# Patient Record
Sex: FEMALE | Race: WHITE | NOT HISPANIC OR LATINO | Employment: OTHER | URBAN - METROPOLITAN AREA
[De-identification: names, ages, dates, MRNs, and addresses within clinical notes are randomized per-mention and may not be internally consistent; named-entity substitution may affect disease eponyms.]

---

## 2017-03-11 ENCOUNTER — HOSPITAL ENCOUNTER (OUTPATIENT)
Facility: HOSPITAL | Age: 62
Setting detail: OBSERVATION
Discharge: HOME | End: 2017-03-13
Attending: EMERGENCY MEDICINE | Admitting: FAMILY MEDICINE

## 2017-03-11 ENCOUNTER — APPOINTMENT (OUTPATIENT)
Dept: CT IMAGING | Facility: HOSPITAL | Age: 62
End: 2017-03-11

## 2017-03-11 ENCOUNTER — APPOINTMENT (OUTPATIENT)
Dept: GENERAL RADIOLOGY | Facility: HOSPITAL | Age: 62
End: 2017-03-11

## 2017-03-11 DIAGNOSIS — E78.2 MIXED HYPERLIPIDEMIA: ICD-10-CM

## 2017-03-11 DIAGNOSIS — R07.9 CHEST PAIN, UNSPECIFIED TYPE: ICD-10-CM

## 2017-03-11 DIAGNOSIS — R94.31 ABNORMAL EKG: ICD-10-CM

## 2017-03-11 DIAGNOSIS — I10 ESSENTIAL HYPERTENSION: ICD-10-CM

## 2017-03-11 DIAGNOSIS — I20.0 UNSTABLE ANGINA (HCC): Primary | ICD-10-CM

## 2017-03-11 DIAGNOSIS — Z87.891 HISTORY OF TOBACCO ABUSE: ICD-10-CM

## 2017-03-11 LAB
ALBUMIN SERPL-MCNC: 3.9 G/DL (ref 3.2–4.8)
ALBUMIN/GLOB SERPL: 1.5 G/DL (ref 1.5–2.5)
ALP SERPL-CCNC: 68 U/L (ref 25–100)
ALT SERPL W P-5'-P-CCNC: 23 U/L (ref 7–40)
ANION GAP SERPL CALCULATED.3IONS-SCNC: 2 MMOL/L (ref 3–11)
AST SERPL-CCNC: 28 U/L (ref 0–33)
BASOPHILS # BLD AUTO: 0.02 10*3/MM3 (ref 0–0.2)
BASOPHILS NFR BLD AUTO: 0.3 % (ref 0–1)
BILIRUB SERPL-MCNC: 0.3 MG/DL (ref 0.3–1.2)
BNP SERPL-MCNC: 10 PG/ML (ref 0–100)
BUN BLD-MCNC: 15 MG/DL (ref 9–23)
BUN/CREAT SERPL: 18.8 (ref 7–25)
CALCIUM SPEC-SCNC: 9 MG/DL (ref 8.7–10.4)
CHLORIDE SERPL-SCNC: 106 MMOL/L (ref 99–109)
CO2 SERPL-SCNC: 34 MMOL/L (ref 20–31)
CREAT BLD-MCNC: 0.8 MG/DL (ref 0.6–1.3)
DEPRECATED RDW RBC AUTO: 54.6 FL (ref 37–54)
EOSINOPHIL # BLD AUTO: 0.09 10*3/MM3 (ref 0.1–0.3)
EOSINOPHIL NFR BLD AUTO: 1.2 % (ref 0–3)
ERYTHROCYTE [DISTWIDTH] IN BLOOD BY AUTOMATED COUNT: 15.2 % (ref 11.3–14.5)
GFR SERPL CREATININE-BSD FRML MDRD: 73 ML/MIN/1.73
GLOBULIN UR ELPH-MCNC: 2.6 GM/DL
GLUCOSE BLD-MCNC: 139 MG/DL (ref 70–100)
HCT VFR BLD AUTO: 35.8 % (ref 34.5–44)
HGB BLD-MCNC: 11.9 G/DL (ref 11.5–15.5)
HOLD SPECIMEN: NORMAL
HOLD SPECIMEN: NORMAL
IMM GRANULOCYTES # BLD: 0.02 10*3/MM3 (ref 0–0.03)
IMM GRANULOCYTES NFR BLD: 0.3 % (ref 0–0.6)
LIPASE SERPL-CCNC: 147 U/L (ref 6–51)
LIPASE SERPL-CCNC: 161 U/L (ref 6–51)
LYMPHOCYTES # BLD AUTO: 1.59 10*3/MM3 (ref 0.6–4.8)
LYMPHOCYTES NFR BLD AUTO: 20.8 % (ref 24–44)
MCH RBC QN AUTO: 33.1 PG (ref 27–31)
MCHC RBC AUTO-ENTMCNC: 33.2 G/DL (ref 32–36)
MCV RBC AUTO: 99.7 FL (ref 80–99)
MONOCYTES # BLD AUTO: 0.17 10*3/MM3 (ref 0–1)
MONOCYTES NFR BLD AUTO: 2.2 % (ref 0–12)
NEUTROPHILS # BLD AUTO: 5.75 10*3/MM3 (ref 1.5–8.3)
NEUTROPHILS NFR BLD AUTO: 75.2 % (ref 41–71)
PLATELET # BLD AUTO: 186 10*3/MM3 (ref 150–450)
PMV BLD AUTO: 9 FL (ref 6–12)
POTASSIUM BLD-SCNC: 3.2 MMOL/L (ref 3.5–5.5)
PROT SERPL-MCNC: 6.5 G/DL (ref 5.7–8.2)
RBC # BLD AUTO: 3.59 10*6/MM3 (ref 3.89–5.14)
SODIUM BLD-SCNC: 142 MMOL/L (ref 132–146)
TROPONIN I SERPL-MCNC: 0 NG/ML (ref 0–0.07)
TROPONIN I SERPL-MCNC: 0 NG/ML (ref 0–0.07)
TROPONIN I SERPL-MCNC: <0.006 NG/ML
WBC NRBC COR # BLD: 7.64 10*3/MM3 (ref 3.5–10.8)
WHOLE BLOOD HOLD SPECIMEN: NORMAL
WHOLE BLOOD HOLD SPECIMEN: NORMAL

## 2017-03-11 PROCEDURE — 93005 ELECTROCARDIOGRAM TRACING: CPT

## 2017-03-11 PROCEDURE — 84484 ASSAY OF TROPONIN QUANT: CPT | Performed by: INTERNAL MEDICINE

## 2017-03-11 PROCEDURE — 99220 PR INITIAL OBSERVATION CARE/DAY 70 MINUTES: CPT | Performed by: INTERNAL MEDICINE

## 2017-03-11 PROCEDURE — 0 IOPAMIDOL 61 % SOLUTION: Performed by: EMERGENCY MEDICINE

## 2017-03-11 PROCEDURE — 83880 ASSAY OF NATRIURETIC PEPTIDE: CPT | Performed by: EMERGENCY MEDICINE

## 2017-03-11 PROCEDURE — 99284 EMERGENCY DEPT VISIT MOD MDM: CPT

## 2017-03-11 PROCEDURE — G0378 HOSPITAL OBSERVATION PER HR: HCPCS

## 2017-03-11 PROCEDURE — 71010 HC CHEST PA OR AP: CPT

## 2017-03-11 PROCEDURE — 74177 CT ABD & PELVIS W/CONTRAST: CPT

## 2017-03-11 PROCEDURE — 84484 ASSAY OF TROPONIN QUANT: CPT

## 2017-03-11 PROCEDURE — 36415 COLL VENOUS BLD VENIPUNCTURE: CPT

## 2017-03-11 PROCEDURE — 85025 COMPLETE CBC W/AUTO DIFF WBC: CPT | Performed by: EMERGENCY MEDICINE

## 2017-03-11 PROCEDURE — 80053 COMPREHEN METABOLIC PANEL: CPT | Performed by: EMERGENCY MEDICINE

## 2017-03-11 PROCEDURE — 83690 ASSAY OF LIPASE: CPT | Performed by: EMERGENCY MEDICINE

## 2017-03-11 PROCEDURE — 25010000002 HEPARIN (PORCINE) PER 1000 UNITS: Performed by: INTERNAL MEDICINE

## 2017-03-11 PROCEDURE — 93005 ELECTROCARDIOGRAM TRACING: CPT | Performed by: EMERGENCY MEDICINE

## 2017-03-11 PROCEDURE — 96372 THER/PROPH/DIAG INJ SC/IM: CPT

## 2017-03-11 RX ORDER — LISINOPRIL AND HYDROCHLOROTHIAZIDE 12.5; 1 MG/1; MG/1
1 TABLET ORAL DAILY
COMMUNITY

## 2017-03-11 RX ORDER — HEPARIN SODIUM 5000 [USP'U]/ML
5000 INJECTION, SOLUTION INTRAVENOUS; SUBCUTANEOUS EVERY 8 HOURS SCHEDULED
Status: DISCONTINUED | OUTPATIENT
Start: 2017-03-11 | End: 2017-03-12

## 2017-03-11 RX ORDER — CARVEDILOL 6.25 MG/1
6.25 TABLET ORAL EVERY 12 HOURS SCHEDULED
Status: DISCONTINUED | OUTPATIENT
Start: 2017-03-11 | End: 2017-03-13 | Stop reason: HOSPADM

## 2017-03-11 RX ORDER — ACETAMINOPHEN 325 MG/1
650 TABLET ORAL EVERY 4 HOURS PRN
Status: DISCONTINUED | OUTPATIENT
Start: 2017-03-11 | End: 2017-03-13 | Stop reason: HOSPADM

## 2017-03-11 RX ORDER — LANOLIN ALCOHOL/MO/W.PET/CERES
800 CREAM (GRAM) TOPICAL 2 TIMES DAILY
COMMUNITY

## 2017-03-11 RX ORDER — ASPIRIN 81 MG/1
324 TABLET, CHEWABLE ORAL ONCE
Status: DISCONTINUED | OUTPATIENT
Start: 2017-03-11 | End: 2017-03-11

## 2017-03-11 RX ORDER — LISINOPRIL 5 MG/1
5 TABLET ORAL
Status: DISCONTINUED | OUTPATIENT
Start: 2017-03-12 | End: 2017-03-13 | Stop reason: HOSPADM

## 2017-03-11 RX ORDER — TRAMADOL HYDROCHLORIDE 50 MG/1
50 TABLET ORAL EVERY 6 HOURS PRN
COMMUNITY

## 2017-03-11 RX ORDER — SODIUM CHLORIDE 0.9 % (FLUSH) 0.9 %
10 SYRINGE (ML) INJECTION AS NEEDED
Status: DISCONTINUED | OUTPATIENT
Start: 2017-03-11 | End: 2017-03-13 | Stop reason: HOSPADM

## 2017-03-11 RX ORDER — TRAZODONE HYDROCHLORIDE 150 MG/1
300 TABLET ORAL NIGHTLY PRN
COMMUNITY

## 2017-03-11 RX ORDER — CELECOXIB 100 MG/1
100 CAPSULE ORAL 2 TIMES DAILY
COMMUNITY
End: 2017-03-11

## 2017-03-11 RX ORDER — SODIUM CHLORIDE 0.9 % (FLUSH) 0.9 %
1-10 SYRINGE (ML) INJECTION AS NEEDED
Status: DISCONTINUED | OUTPATIENT
Start: 2017-03-11 | End: 2017-03-13 | Stop reason: HOSPADM

## 2017-03-11 RX ORDER — ANTIOX #8/OM3/DHA/EPA/LUT/ZEAX 250-2.5 MG
1 CAPSULE ORAL 2 TIMES DAILY
COMMUNITY

## 2017-03-11 RX ORDER — DOCUSATE SODIUM 100 MG/1
200 CAPSULE, LIQUID FILLED ORAL 2 TIMES DAILY
Status: DISCONTINUED | OUTPATIENT
Start: 2017-03-11 | End: 2017-03-13 | Stop reason: HOSPADM

## 2017-03-11 RX ORDER — CELECOXIB 200 MG/1
200 CAPSULE ORAL 2 TIMES DAILY PRN
COMMUNITY

## 2017-03-11 RX ORDER — NITROGLYCERIN 0.4 MG/1
0.4 TABLET SUBLINGUAL
Status: COMPLETED | OUTPATIENT
Start: 2017-03-11 | End: 2017-03-12

## 2017-03-11 RX ORDER — ASPIRIN 325 MG
325 TABLET ORAL DAILY
Status: DISCONTINUED | OUTPATIENT
Start: 2017-03-12 | End: 2017-03-13 | Stop reason: HOSPADM

## 2017-03-11 RX ORDER — DOCUSATE SODIUM 100 MG/1
200 CAPSULE, LIQUID FILLED ORAL 2 TIMES DAILY
COMMUNITY

## 2017-03-11 RX ORDER — TRAMADOL HYDROCHLORIDE 50 MG/1
50 TABLET ORAL EVERY 6 HOURS PRN
Status: DISCONTINUED | OUTPATIENT
Start: 2017-03-11 | End: 2017-03-13 | Stop reason: HOSPADM

## 2017-03-11 RX ORDER — ROSUVASTATIN CALCIUM 5 MG/1
5 TABLET, COATED ORAL WEEKLY
COMMUNITY

## 2017-03-11 RX ORDER — HYDROCODONE BITARTRATE AND ACETAMINOPHEN 7.5; 325 MG/1; MG/1
2 TABLET ORAL EVERY 4 HOURS PRN
Status: DISCONTINUED | OUTPATIENT
Start: 2017-03-11 | End: 2017-03-13 | Stop reason: HOSPADM

## 2017-03-11 RX ORDER — ASPIRIN 81 MG/1
81 TABLET, CHEWABLE ORAL DAILY
COMMUNITY

## 2017-03-11 RX ORDER — TRAZODONE HYDROCHLORIDE 100 MG/1
300 TABLET ORAL NIGHTLY PRN
Status: DISCONTINUED | OUTPATIENT
Start: 2017-03-11 | End: 2017-03-13 | Stop reason: HOSPADM

## 2017-03-11 RX ORDER — ROSUVASTATIN CALCIUM 10 MG/1
5 TABLET, COATED ORAL WEEKLY
Status: DISCONTINUED | OUTPATIENT
Start: 2017-03-11 | End: 2017-03-13 | Stop reason: HOSPADM

## 2017-03-11 RX ADMIN — IOPAMIDOL 85 ML: 612 INJECTION, SOLUTION INTRAVENOUS at 16:15

## 2017-03-11 RX ADMIN — NITROGLYCERIN 0.4 MG: 0.4 TABLET SUBLINGUAL at 15:59

## 2017-03-11 RX ADMIN — NITROGLYCERIN 0.4 MG: 0.4 TABLET SUBLINGUAL at 15:51

## 2017-03-11 RX ADMIN — HEPARIN SODIUM 5000 UNITS: 5000 INJECTION, SOLUTION INTRAVENOUS; SUBCUTANEOUS at 21:36

## 2017-03-11 NOTE — ED PROVIDER NOTES
"Subjective   HPI Comments: Ms. Tita Chaudhry is a 60 yo female who presents to the ED with c/o left chest pain onset suddenly at 1300 today. She reports that the pain feels like a squeezing and she has never had this feeling before. She denies nay nausea or diaphoresis, but has been dizzy. It does not radiate, but she reports left arm numbness. It temporarily resolved with Aspirin and nitroglycerin. She has no hx of heart disease or diabetes. But does have a hx of HTN, HLD, and smoking. She has had no fevers, vomiting, and diarrhea.       Patient is a 61 y.o. female presenting with chest pain.   History provided by:  Patient  Chest Pain   Pain location:  L chest  Pain quality: tightness (squeezing)    Pain radiates to:  Does not radiate  Pain severity:  Moderate  Onset quality:  Sudden  Duration:  3 hours  Timing:  Constant  Progression:  Waxing and waning  Chronicity:  New  Relieved by:  Nitroglycerin and aspirin  Worsened by:  Nothing  Associated symptoms: dizziness and numbness (left arm)    Associated symptoms: no abdominal pain, no cough, no diaphoresis, no fever, no lower extremity edema, no nausea, no shortness of breath and no vomiting    Risk factors: high cholesterol, hypertension and smoking    Risk factors: no coronary artery disease, no diabetes mellitus and not obese        Review of Systems   Constitutional: Negative for chills, diaphoresis and fever.   Respiratory: Negative for cough and shortness of breath.    Cardiovascular: Positive for chest pain.   Gastrointestinal: Negative for abdominal pain, diarrhea, nausea and vomiting.   Neurological: Positive for dizziness and numbness (left arm).   All other systems reviewed and are negative.      Past Medical History   Diagnosis Date   • Depression    • Hypertension        Allergies   Allergen Reactions   • Morphine Other (See Comments)     Makes the patient \"loopy\"       History reviewed. No pertinent past surgical history.    History reviewed. No " pertinent family history.    Social History     Social History   • Marital status:      Spouse name: N/A   • Number of children: N/A   • Years of education: N/A     Social History Main Topics   • Smoking status: Never Smoker   • Smokeless tobacco: None   • Alcohol use No   • Drug use: No   • Sexual activity: Defer     Other Topics Concern   • None     Social History Narrative   • None         Objective   Physical Exam   Constitutional: She is oriented to person, place, and time. She appears well-developed and well-nourished. No distress.   HENT:   Head: Normocephalic and atraumatic.   Eyes: Conjunctivae are normal. No scleral icterus.   Neck: Normal range of motion. Neck supple.   Cardiovascular: Normal rate, regular rhythm and normal heart sounds.    Pulmonary/Chest: Effort normal and breath sounds normal. No respiratory distress.   Abdominal: Soft. Bowel sounds are normal. There is no tenderness.   Musculoskeletal: Normal range of motion. She exhibits no edema.   Neurological: She is alert and oriented to person, place, and time.   Skin: Skin is warm and dry.   Psychiatric: She has a normal mood and affect. Her behavior is normal.   Nursing note and vitals reviewed.      Procedures         ED Course  ED Course   EKGs show ST depression in lateral leads.  No old ones for comparison.  CXR negative.  Pain resolves with NTG but recurs.  Lipase is up but not quite to 3x normal and CT negative for pancreatitis. Patient stable on serial rechecks.  Discussed exam findings, test results so far and concerns in detail at the bedside.  I am concerned this represents an early ACS.  Discussed need for admission for further evaluation and treatment.  I consulted the hospitalist who requested I hold off on blood thinners until he saw the patient.                    MDM  Number of Diagnoses or Management Options  Chest pain, unspecified type:      Amount and/or Complexity of Data Reviewed  Clinical lab tests: ordered and  reviewed  Tests in the radiology section of CPT®: ordered and reviewed  Discuss the patient with other providers: yes  Independent visualization of images, tracings, or specimens: yes        Final diagnoses:   Chest pain, unspecified type       Documentation assistance provided by willam Pritchett.  Information recorded by the scribe was done at my direction and has been verified and validated by me.     Chet Pritchett  03/11/17 1537       Vlad Pierson MD  03/11/17 2027

## 2017-03-11 NOTE — PROGRESS NOTES
Discharge Planning Assessment  [facility]     Patient Name: Tita Chaudhry  MRN: [id number]  Today's Date: 3/11/2017    Admit Date: 3/11/2017          Discharge Needs Assessment       03/11/17 1836    Living Environment    Lives With alone    Living Arrangements house    Provides Primary Care For no one    Quality Of Family Relationships unable to assess    Able to Return to Prior Living Arrangements yes    Discharge Needs Assessment    Concerns To Be Addressed no discharge needs identified    Readmission Within The Last 30 Days no previous admission in last 30 days    Anticipated Changes Related to Illness none    Equipment Currently Used at Home none    Equipment Needed After Discharge none    Transportation Available car;family or friend will provide    Discharge Contact Information if Applicable Ladi Camacho, mother, [phone]            Discharge Plan       03/11/17 1837    Case Management/Social Work Plan    Plan HOME    Additional Comments Pt lives alone in [place], is independent, no DME needs or oxygen. PCP Barby Cordon        Discharge Placement     No information found                Demographic Summary     None            Functional Status       03/11/17 1834    Functional Status Current    Ambulation 2-->assistive person    Transferring 2-->assistive person    Toileting 0-->independent    Bathing 0-->independent    Dressing 0-->independent    Eating 0-->independent    Communication 0-->understands/communicates without difficulty    Swallowing (if score 2 or more for any item, consult Rehab Services) 0-->swallows foods/liquids without difficulty    Change in Functional Status Since Onset of Current Illness/Injury no    Functional Status Prior    Ambulation 0-->independent    Transferring 0-->independent    Toileting 0-->independent    Bathing 0-->independent    Dressing 0-->independent    Eating 0-->independent    Communication 0-->understands/communicates without difficulty    Swallowing 0-->swallows  foods/liquids without difficulty    IADL    Medications independent    Meal Preparation independent    Housekeeping independent    Laundry independent    Shopping independent    Oral Care independent    Activity Tolerance    Current Activity Limitations none    Usual Activity Tolerance good    Current Activity Tolerance moderate    Cognitive/Perceptual/Developmental    Current Mental Status/Cognitive Functioning no deficits noted    Developmental Stage (Eriksson's Stages of Development) Stage 7 (35-65 years/Middle Adulthood) Generativity vs. Stagnation    Employment/Financial    Employment/Finance Comments insurance verified            Psychosocial     None            Abuse/Neglect     None            Legal     None            Substance Abuse     None            Patient Forms     None          Elba Lan

## 2017-03-12 ENCOUNTER — APPOINTMENT (OUTPATIENT)
Dept: CARDIOLOGY | Facility: HOSPITAL | Age: 62
End: 2017-03-12
Attending: INTERNAL MEDICINE

## 2017-03-12 LAB
ALBUMIN SERPL-MCNC: 3.4 G/DL (ref 3.2–4.8)
ALBUMIN/GLOB SERPL: 1.5 G/DL (ref 1.5–2.5)
ALP SERPL-CCNC: 57 U/L (ref 25–100)
ALT SERPL W P-5'-P-CCNC: 20 U/L (ref 7–40)
ANION GAP SERPL CALCULATED.3IONS-SCNC: 2 MMOL/L (ref 3–11)
ARTICHOKE IGE QN: 105 MG/DL (ref 0–130)
AST SERPL-CCNC: 22 U/L (ref 0–33)
BASOPHILS # BLD AUTO: 0.03 10*3/MM3 (ref 0–0.2)
BASOPHILS NFR BLD AUTO: 0.4 % (ref 0–1)
BILIRUB SERPL-MCNC: 0.3 MG/DL (ref 0.3–1.2)
BUN BLD-MCNC: 14 MG/DL (ref 9–23)
BUN/CREAT SERPL: 17.5 (ref 7–25)
CALCIUM SPEC-SCNC: 8.6 MG/DL (ref 8.7–10.4)
CHLORIDE SERPL-SCNC: 105 MMOL/L (ref 99–109)
CHOLEST SERPL-MCNC: 166 MG/DL (ref 0–200)
CO2 SERPL-SCNC: 33 MMOL/L (ref 20–31)
CREAT BLD-MCNC: 0.8 MG/DL (ref 0.6–1.3)
DEPRECATED RDW RBC AUTO: 54.8 FL (ref 37–54)
EOSINOPHIL # BLD AUTO: 0.24 10*3/MM3 (ref 0.1–0.3)
EOSINOPHIL NFR BLD AUTO: 3.1 % (ref 0–3)
ERYTHROCYTE [DISTWIDTH] IN BLOOD BY AUTOMATED COUNT: 15.3 % (ref 11.3–14.5)
GFR SERPL CREATININE-BSD FRML MDRD: 73 ML/MIN/1.73
GLOBULIN UR ELPH-MCNC: 2.2 GM/DL
GLUCOSE BLD-MCNC: 107 MG/DL (ref 70–100)
HBA1C MFR BLD: 5.3 % (ref 4.8–5.6)
HCT VFR BLD AUTO: 32.8 % (ref 34.5–44)
HDLC SERPL-MCNC: 39 MG/DL (ref 40–60)
HGB BLD-MCNC: 10.7 G/DL (ref 11.5–15.5)
IMM GRANULOCYTES # BLD: 0.01 10*3/MM3 (ref 0–0.03)
IMM GRANULOCYTES NFR BLD: 0.1 % (ref 0–0.6)
LYMPHOCYTES # BLD AUTO: 3.13 10*3/MM3 (ref 0.6–4.8)
LYMPHOCYTES NFR BLD AUTO: 40 % (ref 24–44)
MAGNESIUM SERPL-MCNC: 2 MG/DL (ref 1.3–2.7)
MCH RBC QN AUTO: 32.7 PG (ref 27–31)
MCHC RBC AUTO-ENTMCNC: 32.6 G/DL (ref 32–36)
MCV RBC AUTO: 100.3 FL (ref 80–99)
MONOCYTES # BLD AUTO: 0.25 10*3/MM3 (ref 0–1)
MONOCYTES NFR BLD AUTO: 3.2 % (ref 0–12)
NEUTROPHILS # BLD AUTO: 4.16 10*3/MM3 (ref 1.5–8.3)
NEUTROPHILS NFR BLD AUTO: 53.2 % (ref 41–71)
PHOSPHATE SERPL-MCNC: 3.2 MG/DL (ref 2.4–5.1)
PLATELET # BLD AUTO: 182 10*3/MM3 (ref 150–450)
PMV BLD AUTO: 9.2 FL (ref 6–12)
POTASSIUM BLD-SCNC: 3.4 MMOL/L (ref 3.5–5.5)
PROT SERPL-MCNC: 5.6 G/DL (ref 5.7–8.2)
RBC # BLD AUTO: 3.27 10*6/MM3 (ref 3.89–5.14)
SODIUM BLD-SCNC: 140 MMOL/L (ref 132–146)
TRIGL SERPL-MCNC: 241 MG/DL (ref 0–150)
TROPONIN I SERPL-MCNC: <0.006 NG/ML
WBC NRBC COR # BLD: 7.82 10*3/MM3 (ref 3.5–10.8)

## 2017-03-12 PROCEDURE — 25010000002 SULFUR HEXAFLUORIDE MICROSPH 60.7-25 MG RECONSTITUTED SUSPENSION

## 2017-03-12 PROCEDURE — 83036 HEMOGLOBIN GLYCOSYLATED A1C: CPT | Performed by: INTERNAL MEDICINE

## 2017-03-12 PROCEDURE — 85025 COMPLETE CBC W/AUTO DIFF WBC: CPT | Performed by: INTERNAL MEDICINE

## 2017-03-12 PROCEDURE — 25010000002 HEPARIN (PORCINE) PER 1000 UNITS: Performed by: INTERNAL MEDICINE

## 2017-03-12 PROCEDURE — 80061 LIPID PANEL: CPT | Performed by: INTERNAL MEDICINE

## 2017-03-12 PROCEDURE — 93306 TTE W/DOPPLER COMPLETE: CPT | Performed by: INTERNAL MEDICINE

## 2017-03-12 PROCEDURE — 25010000002 SULFUR HEXAFLUORIDE MICROSPH 60.7-25 MG RECONSTITUTED SUSPENSION: Performed by: FAMILY MEDICINE

## 2017-03-12 PROCEDURE — 93010 ELECTROCARDIOGRAM REPORT: CPT | Performed by: INTERNAL MEDICINE

## 2017-03-12 PROCEDURE — 96372 THER/PROPH/DIAG INJ SC/IM: CPT

## 2017-03-12 PROCEDURE — 25010000002 ENOXAPARIN PER 10 MG: Performed by: PHYSICIAN ASSISTANT

## 2017-03-12 PROCEDURE — 80053 COMPREHEN METABOLIC PANEL: CPT | Performed by: INTERNAL MEDICINE

## 2017-03-12 PROCEDURE — 84100 ASSAY OF PHOSPHORUS: CPT | Performed by: INTERNAL MEDICINE

## 2017-03-12 PROCEDURE — C8929 TTE W OR WO FOL WCON,DOPPLER: HCPCS

## 2017-03-12 PROCEDURE — 93005 ELECTROCARDIOGRAM TRACING: CPT | Performed by: FAMILY MEDICINE

## 2017-03-12 PROCEDURE — 83735 ASSAY OF MAGNESIUM: CPT | Performed by: INTERNAL MEDICINE

## 2017-03-12 PROCEDURE — 99242 OFF/OP CONSLTJ NEW/EST SF 20: CPT | Performed by: INTERNAL MEDICINE

## 2017-03-12 PROCEDURE — 99226 PR SBSQ OBSERVATION CARE/DAY 35 MINUTES: CPT | Performed by: FAMILY MEDICINE

## 2017-03-12 PROCEDURE — G0378 HOSPITAL OBSERVATION PER HR: HCPCS

## 2017-03-12 RX ORDER — CLOPIDOGREL BISULFATE 75 MG/1
300 TABLET ORAL ONCE
Status: COMPLETED | OUTPATIENT
Start: 2017-03-12 | End: 2017-03-12

## 2017-03-12 RX ORDER — POTASSIUM CHLORIDE 750 MG/1
20 CAPSULE, EXTENDED RELEASE ORAL ONCE
Status: COMPLETED | OUTPATIENT
Start: 2017-03-12 | End: 2017-03-12

## 2017-03-12 RX ORDER — CLOPIDOGREL BISULFATE 75 MG/1
75 TABLET ORAL DAILY
Status: DISCONTINUED | OUTPATIENT
Start: 2017-03-13 | End: 2017-03-13 | Stop reason: HOSPADM

## 2017-03-12 RX ADMIN — CLOPIDOGREL BISULFATE 300 MG: 75 TABLET, FILM COATED ORAL at 23:52

## 2017-03-12 RX ADMIN — DOCUSATE SODIUM 200 MG: 100 CAPSULE, LIQUID FILLED ORAL at 17:40

## 2017-03-12 RX ADMIN — ASPIRIN 325 MG ORAL TABLET 325 MG: 325 PILL ORAL at 08:21

## 2017-03-12 RX ADMIN — SULFUR HEXAFLUORIDE 3 ML: KIT at 08:30

## 2017-03-12 RX ADMIN — TRAMADOL HYDROCHLORIDE 50 MG: 50 TABLET, COATED ORAL at 21:00

## 2017-03-12 RX ADMIN — ENOXAPARIN SODIUM 60 MG: 60 INJECTION SUBCUTANEOUS at 14:28

## 2017-03-12 RX ADMIN — DOCUSATE SODIUM 200 MG: 100 CAPSULE, LIQUID FILLED ORAL at 08:23

## 2017-03-12 RX ADMIN — TRAZODONE HYDROCHLORIDE 300 MG: 100 TABLET ORAL at 22:00

## 2017-03-12 RX ADMIN — POTASSIUM CHLORIDE 20 MEQ: 750 CAPSULE, EXTENDED RELEASE ORAL at 14:26

## 2017-03-12 RX ADMIN — HEPARIN SODIUM 5000 UNITS: 5000 INJECTION, SOLUTION INTRAVENOUS; SUBCUTANEOUS at 06:12

## 2017-03-12 RX ADMIN — CLOPIDOGREL BISULFATE 300 MG: 75 TABLET, FILM COATED ORAL at 14:27

## 2017-03-12 RX ADMIN — LISINOPRIL 5 MG: 5 TABLET ORAL at 08:20

## 2017-03-12 RX ADMIN — NITROGLYCERIN 0.4 MG: 0.4 TABLET SUBLINGUAL at 10:03

## 2017-03-12 NOTE — PLAN OF CARE
Problem: Patient Care Overview (Adult)  Goal: Plan of Care Review  Outcome: Ongoing (interventions implemented as appropriate)    03/12/17 1503   Coping/Psychosocial Response Interventions   Plan Of Care Reviewed With patient   Patient Care Overview   Progress no change   Outcome Evaluation   Outcome Summary/Follow up Plan Alert and oriented, has c/o chest pain times one this shift, she was given nitro x 1 and got relief. Has been talking with family and friends with no c/o voiced.       Goal: Adult Individualization and Mutuality  Outcome: Ongoing (interventions implemented as appropriate)  Goal: Discharge Needs Assessment  Outcome: Ongoing (interventions implemented as appropriate)    Problem: Acute Coronary Syndrome (ACS) (Adult)  Goal: Signs and Symptoms of Listed Potential Problems Will be Absent or Manageable (Acute Coronary Syndrome)  Outcome: Ongoing (interventions implemented as appropriate)

## 2017-03-12 NOTE — PROGRESS NOTES
Hospitalist Daily Progress Note    Date of Admission: 3/11/2017   LOS: 0 days   PCP: Barby Cordon MD    Chief Complaint: chest pain    Subjective   History of Present Illness  Pt still having some CP    Review of Systems  General: no fevers, chills  Respiratory: no cough, dyspnea  Cardiovascular: no  palpitations  Abdomen: No abdominal pain, nausea, vomiting, or diarrhea  Neurologic: No focal weakness  All other systems reviewed and negative.    Objective   Physical Exam:  I have reviewed the vital signs.  Temp:  [96.9 °F (36.1 °C)-98.3 °F (36.8 °C)] 96.9 °F (36.1 °C)  Heart Rate:  [59-86] 65  Resp:  [12-18] 18  BP: (114-143)/(70-94) 136/79    Objective  General Appearance:    Alert, cooperative, no distress  Head:    Normocephalic, atraumatic  Eyes:    PERRL EOMI, Sclerae anicteric  Neck:   Supple, no mass  Lungs: Clear to auscultation bilaterally, respirations unlabored  Heart: Regular rate and rhythm, S1 and S2 normal, no murmur, rub or gallop  Abdomen:  Soft, non-tender, bowel sounds active, nondistended  Extremities: No clubbing, cyanosis, or edema to lower extremities  Pulses:  2+ and symmetric in distal lower extremities  Skin: No rashes no jaundice  Neurologic: Oriented x3, Normal strength to extremities    Results Review:    I have reviewed the labs, radiology results and diagnostic studies.      Results from last 7 days  Lab Units 03/12/17  0622   WBC 10*3/mm3 7.82   HEMOGLOBIN g/dL 10.7*   PLATELETS 10*3/mm3 182       Results from last 7 days  Lab Units 03/12/17  0622   SODIUM mmol/L 140   POTASSIUM mmol/L 3.4*   TOTAL CO2 mmol/L 33.0*   CREATININE mg/dL 0.80   GLUCOSE mg/dL 107*       I have reviewed the medications.    ---------------------------------------------------------------------------------------------  Assessment/Plan   Assessment & Plan  Assessment/Problem List    Active Problems:    Chest pain       Plan  -BNP wnl, troponin x 4 negative, EKG with ST depression in lateral leads. No old  EKGs for comparison.   -CXR on my review shows no acute infiltrates, no cardiomegaly, official radiology report pending.  -A1c 5.3%  -FLP with T.Chol 166, , HDL 39, Trig. 241  -ECHO pending  -ASA, coreg, plavix load and plavix 75 mg daily, lovenox 60 mg x 1.  -statin  -NPO after MN, LHC tomorrow.    -lipase 147, CT abd/pelvis unremarkable, no pancreatitis.  -low potassium, replaced.      DVT prophylaxis: lovenox  Discharge Planning: I expect patient to be discharged home in 1-2 days.    Abilio Kim MD 03/12/17 1:41 PM

## 2017-03-12 NOTE — PLAN OF CARE
Problem: Acute Coronary Syndrome (ACS) (Adult)  Goal: Signs and Symptoms of Listed Potential Problems Will be Absent or Manageable (Acute Coronary Syndrome)  Outcome: Ongoing (interventions implemented as appropriate)

## 2017-03-12 NOTE — PLAN OF CARE
Problem: Patient Care Overview (Adult)  Goal: Plan of Care Review  Outcome: Ongoing (interventions implemented as appropriate)    03/12/17 0551   Coping/Psychosocial Response Interventions   Plan Of Care Reviewed With patient   Patient Care Overview   Progress progress toward functional goals as expected   Outcome Evaluation   Outcome Summary/Follow up Plan room air sinus sinus jake in the 50's refused coreg last PM states it makes her feel to lethargic no ches pain or pressure tonight awaiting card consult

## 2017-03-12 NOTE — H&P
Hospital Medicine History and Physical    Primary Care Physician: Barby Cordon MD    Chief complaint CP    Subjective     History of Present Illness  Patient is a 61-year-old  female past medical history significant for hypertension, hyperlipidemia, arthritis, depression.  Also patient has had the about 10 pack years history of smoking but completely and tobacco free since about 3 years ago.  Patient evidently was in her usual state of health until today when the she started experiencing and chest pain.  Describes chest pain as more like a heaviness than the pain but it was the also hurting her.  Location was is to the left of sternum.  Pain radiated to the left arm and was associated with an some diaphoresis.  Denies any shortness of breath associated with it.  Patient tells me that and the pain episode lasted at least about half an hour until I'm SGOT that there and they started him her on nitroglycerin and which resolved pain.  Patient was brought to the emergency room and at the emergency room she had an other episode which responded to nitroglycerin.  Patient denies any previous history of chest pain.  Currently patient is resting in bed in no acute distress quite comfortable.  She is completely chest pain-free.  Hemodynamically completely stable and actually she was to go home.  Review of Systems   Otherwise complete ROS is negative except as mentioned in the HPI.    Past Medical History:   Past Medical History   Diagnosis Date   • Depression    • Hypertension        Past Surgical History:History reviewed. No pertinent past surgical history.    Family History: family history is not on file. no history of heart disease on the father's side.  History of hypertension on mother side.    Social History:  reports that she has never smoked. She does not have any smokeless tobacco history on file. She reports that she does not drink alcohol or use illicit drugs.    Medications:  Prescriptions Prior to  "Admission   Medication Sig Dispense Refill Last Dose   • aspirin 81 MG chewable tablet Chew 81 mg Daily.      • celecoxib (CeleBREX) 200 MG capsule Take 200 mg by mouth 2 (Two) Times a Day As Needed for Mild Pain (1-3).      • docusate sodium (COLACE) 100 MG capsule Take 200 mg by mouth 2 (Two) Times a Day.      • Etanercept (ENBREL) 25 MG reconstituted solution Inject 1 kit under the skin 1 (One) Time Per Week. Wednesdays      • folic acid (FOLVITE) 400 MCG tablet Take 800 mcg by mouth 2 (Two) Times a Day.      • lisinopril-hydrochlorothiazide (PRINZIDE,ZESTORETIC) 10-12.5 MG per tablet Take 1 tablet by mouth Daily.      • methotrexate 2.5 MG tablet Take 17.5 mg by mouth 1 (One) Time Per Week. Wednesdays      • multivitamins-minerals (PRESERVISION AREDS 2) capsule capsule Take 1 capsule by mouth 2 (Two) Times a Day.      • rosuvastatin (CRESTOR) 5 MG tablet Take 5 mg by mouth 1 (One) Time Per Week.      • traMADol (ULTRAM) 50 MG tablet Take 50 mg by mouth Every 6 (Six) Hours As Needed for moderate pain (4-6).      • traZODone (DESYREL) 150 MG tablet Take 300 mg by mouth At Night As Needed for sleep.      • Vortioxetine HBr (TRINTELLIX) 10 MG tablet Take 10 mg by mouth Daily.        Allergies   Allergen Reactions   • Morphine And Related Confusion       Objective    Physical Exam:   Vital Signs:   Visit Vitals   • /79 (BP Location: Left arm, Patient Position: Lying)   • Pulse 59   • Temp 98 °F (36.7 °C) (Temporal Artery )   • Resp 14   • Ht 68\" (172.7 cm)   • Wt 123 lb 12.8 oz (56.2 kg)   • SpO2 98%   • BMI 18.82 kg/m2     Physical Exam  General: Resting in bed in no acute distress.  Neurologic exam: Awake, alert, oriented ×3.  There is no focal weakness or numbness present.  HEENT: PERRLA.  Neck: Supple, no cervical lymphadenopathy present.  Lungs: Clear to auscultation bilaterally.  Cardiovascular: S1 and S2 present and normal.  Regular rate and rhythm.  No murmur gallop or rub audible.  GI: Soft, nontender, " not distended, bowel sounds present.  Extremities: No edema lower extremities.  Psychiatric exam: Normal mood and affect.    Results Reviewed:  I have personally reviewed current lab, radiology, and data and agree.    Results from last 7 days  Lab Units 03/11/17  1450   WBC 10*3/mm3 7.64   HEMOGLOBIN g/dL 11.9   PLATELETS 10*3/mm3 186       Results from last 7 days  Lab Units 03/11/17  1450   SODIUM mmol/L 142   POTASSIUM mmol/L 3.2*   TOTAL CO2 mmol/L 34.0*   CREATININE mg/dL 0.80   GLUCOSE mg/dL 139*   CALCIUM mg/dL 9.0           Assessment/Plan   Assessment & Plan  Active Problems:    Chest pain, substernal, radiating to the left arm, relieved by nitroglycerin.  Patient has multiple risk factors including history of smoking, age, hypertension and hyperlipidemia.     Hypertension, Currently controlled.    Hyperlipidemia,  is on statin therapy.     History of precancerous polyp in the colon and status post partial colectomy.     Depression.  Currently patient is asymptomatic.     Severe rheumatoid arthritis and patient is on disease modifying treatment.    PLAN:  -Admitted the patient to telemetry and monitor the heart.  - Aspirin, Coreg, lisinopril, nitroglycerin.  - Serial troponin.  - Labs in a.m. and also consult cardiology in a.m.  - Echocardiogram  - Continue home medication.  - DVT prophylaxis.        I discussed the patients findings and my recommendations with the patient and her family who were present at the bedside.  NOTE: Talked to the patient in details about the allergy to morphine.  Evidently patient gets confused and sometimes even hallucinates when she takes morphine however and no evidence of any allergic reaction.  The information was conveyed to pharmacy.    Renaldo Rojas MD 03/11/17 7:42 PM

## 2017-03-12 NOTE — CONSULTS
"[facility] at [facility]  CARDIOLOGY CONSULTATION NOTE    Tita Chaudhry  : 1955  MRN:[id number]  Home Phone:[phone]  [address]  [facility] [address]    Date of Admission:3/11/2017  Date of Consultation: 17    PCP: Barby Cordon MD    IDENTIFICATION: A 61 y.o. female resident of [place], [place]     Chief Complaint   Patient presents with   • Chest Pain       PROBLEM LIST:   1. Chest pain with abnormal (non-specific) EKG.  2. Hypertension  3. Hyperlipidemia  4. History of tobacco abuse, inactive 3 years ago  5. Rheumatoid and psoriatic arthritis  6. Depression   7. History of detached retina  8. Surgical history:   A. Partial colectomy    B. FRANCISCO JAVIER   C. Appendectomy    ALLERGIES:   Allergies   Allergen Reactions   • Morphine Other (See Comments)     Makes the patient \"loopy\"       HPI: Patient is a pleasant 62 y/o WF with history of HTN, HLP, and tobacco abuse, inactive 3 years ago, with no prior cardiac history who is seen in consultation today. She was watching a basketball game yesterday at a local restaurant, when she had acute onset of left sided chest pressure, which radiated to her left arm, and jaw. She had not eaten her dinner yet. EMS services were called and she was given Nitroglycerin which helped relieve her symptoms. These lasted for 45 minutes, and she had recurrence of chest pain in the ED, again relieved with Nitro. She denies associated symptoms of shortness of breath or nausea. She has been asymptomatic since being admitted, has ambulated with no recurring chest discomfort. She used to smoke 4-5 cigarettes daily for 30 years, but quit 3 years ago, moderate alcohol use, no drugs. She has no prior history of chest discomfort and no prior cardiac work-up. Currently she is chest pain free. She denies history of stroke or MI. Her only 2 bouts of pain were yesterday.    ROS: All systems have been reviewed and are negative with the exception of those " "mentioned in the HPI and problem list above.    Surgical History: History reviewed. As above     Social History     Social History   • Marital status:      Spouse name: N/A   • Number of children: N/A   • Years of education: N/A     Occupational History   • Not on file.     Social History Main Topics   • Smoking status: Never Smoker   • Smokeless tobacco: Not on file   • Alcohol use No   • Drug use: No   • Sexual activity: Defer     Family History: History reviewed. No pertinent family history.    Objective     Visit Vitals   • /93   • Pulse 62   • Temp 96.9 °F (36.1 °C) (Temporal Artery )   • Resp 12   • Ht 68\" (172.7 cm)   • Wt 123 lb 12.8 oz (56.2 kg)   • SpO2 96%   • BMI 18.82 kg/m2       Intake/Output Summary (Last 24 hours) at 03/12/17 0934  Last data filed at 03/12/17 0500   Gross per 24 hour   Intake    240 ml   Output   1000 ml   Net   -760 ml       PHYSICAL EXAM:  Constitutional:  Well-nourished, cooperative, in no acute distress.   Head:  Normocephalic, without obvious abnormality, atraumatic.   Neck: No adenopathy, supple, trachea midline, no thyromegaly, no    carotid bruit, no JVD.   Respiratory:   Clear to auscultation bilaterally; respirations regular, even and unlabored. No wheezes, rales or ronchi.    Cardiovascular:  Regular rhythm and normal rate, normal S1 and S2, no            murmur, no gallop, no rub, no click. No chest wall tenderness.   Pulses: Peripheral pulses are present and equal bilaterally.   GI:   Soft, non-distended. Bowel sounds heard throughout. No organomegaly or masses. Non-tender to palpation, no guarding.   Extremities: No edema, clubbing or cyanosis.   Skin: Skin is warm and dry. No bleeding, bruising or rash.   Neurological: Alert, oriented to time, person and place. No focal deficits.     Labs/Diagnostic Data    Results from last 7 days  Lab Units 03/12/17  0622 03/11/17  1450   SODIUM mmol/L 140 142   POTASSIUM mmol/L 3.4* 3.2*   CHLORIDE mmol/L 105 106 "   TOTAL CO2 mmol/L 33.0* 34.0*   BUN mg/dL 14 15   CREATININE mg/dL 0.80 0.80   GLUCOSE mg/dL 107* 139*   CALCIUM mg/dL 8.6* 9.0       Results from last 7 days  Lab Units 03/11/17  2333 03/11/17  2002   TROPONIN I ng/mL <0.006 <0.006       Results from last 7 days  Lab Units 03/12/17  0622 03/11/17  1450   WBC 10*3/mm3 7.82 7.64   HEMOGLOBIN g/dL 10.7* 11.9   HEMATOCRIT % 32.8* 35.8   PLATELETS 10*3/mm3 182 186       Results from last 7 days  Lab Units 03/12/17  0622   MAGNESIUM mg/dL 2.0       Results from last 7 days  Lab Units 03/12/17  0622   CHOLESTEROL mg/dL 166   TRIGLYCERIDES mg/dL 241*   HDL CHOL mg/dL 39*   LDL CHOL mg/dL 105           Results from last 7 days  Lab Units 03/12/17  0622   HEMOGLOBIN A1C % 5.30     I personally viewed and interpreted the patient's EKG/Telemetry data    Radiology Data:   CT Abdomen 3/11/17:  IMPRESSION:  1. No acute abnormality. Specifically, no radiographic evidence of acute   pancreatitis.  2. Tiny hiatal hernia.   3. Bilateral renal cortical thinning, consider renal insufficiency.   4. Right hemicolectomy.     Current Medications:      aspirin 325 mg Oral Daily   carvedilol 6.25 mg Oral Q12H   docusate sodium 200 mg Oral BID   heparin (porcine) 5,000 Units Subcutaneous Q8H   lisinopril 5 mg Oral Q24H   rosuvastatin 5 mg Oral Weekly          Assessment and Plan:     1. Chest pain   - symptoms of unstable angina, troponins negative x2, EKG with ST depression in lateral leads    - Echo underway              - her best option is cath with PCI standby and after I discussed this with her, she consents. Answered all questions.    2. HTN: controlled  3. HLD: on Crestor 5 mg nightly.     Thank you for allowing me to participate in the care of Tita Chaudhry. Feel free to contact me directly with any further questions or concerns.    I, Sanjiv Hammer MD, personally performed the services described as documented by the above named individual. I have made any necessary edits and it  is both accurate and complete 3/12/2017  12:17 PM      Cyndi Michelle PA-C   03/12/17   9:34 AM

## 2017-03-13 VITALS
OXYGEN SATURATION: 92 % | DIASTOLIC BLOOD PRESSURE: 84 MMHG | HEART RATE: 55 BPM | RESPIRATION RATE: 14 BRPM | WEIGHT: 123 LBS | SYSTOLIC BLOOD PRESSURE: 110 MMHG | TEMPERATURE: 97.6 F | HEIGHT: 68 IN | BODY MASS INDEX: 18.64 KG/M2

## 2017-03-13 LAB
ANION GAP SERPL CALCULATED.3IONS-SCNC: 1 MMOL/L (ref 3–11)
BH CV ECHO MEAS - AI DEC SLOPE: 463 CM/SEC^2
BH CV ECHO MEAS - AI MAX PG: 78.7 MMHG
BH CV ECHO MEAS - AI MAX VEL: 443.5 CM/SEC
BH CV ECHO MEAS - AI P1/2T: 280.6 MSEC
BH CV ECHO MEAS - AO ROOT AREA (BSA CORRECTED): 1.7
BH CV ECHO MEAS - AO ROOT AREA: 6.2 CM^2
BH CV ECHO MEAS - AO ROOT DIAM: 2.8 CM
BH CV ECHO MEAS - BSA(HAYCOCK): 1.6 M^2
BH CV ECHO MEAS - BSA: 1.7 M^2
BH CV ECHO MEAS - BZI_BMI: 18.7 KILOGRAMS/M^2
BH CV ECHO MEAS - BZI_METRIC_HEIGHT: 172.7 CM
BH CV ECHO MEAS - BZI_METRIC_WEIGHT: 55.8 KG
BH CV ECHO MEAS - CONTRAST EF (2CH): 65.1 ML/M^2
BH CV ECHO MEAS - CONTRAST EF 4CH: 63.8 ML/M^2
BH CV ECHO MEAS - EDV(CUBED): 87.5 ML
BH CV ECHO MEAS - EDV(MOD-SP2): 63 ML
BH CV ECHO MEAS - EDV(MOD-SP4): 58 ML
BH CV ECHO MEAS - EDV(TEICH): 89.6 ML
BH CV ECHO MEAS - EF(CUBED): 75.7 %
BH CV ECHO MEAS - EF(MOD-SP2): 65.1 %
BH CV ECHO MEAS - EF(MOD-SP4): 63.8 %
BH CV ECHO MEAS - EF(TEICH): 67.9 %
BH CV ECHO MEAS - ESV(CUBED): 21.3 ML
BH CV ECHO MEAS - ESV(MOD-SP2): 22 ML
BH CV ECHO MEAS - ESV(MOD-SP4): 21 ML
BH CV ECHO MEAS - ESV(TEICH): 28.8 ML
BH CV ECHO MEAS - FS: 37.6 %
BH CV ECHO MEAS - IVS/LVPW: 1
BH CV ECHO MEAS - IVSD: 0.84 CM
BH CV ECHO MEAS - LA DIMENSION: 3.1 CM
BH CV ECHO MEAS - LA/AO: 1.1
BH CV ECHO MEAS - LV DIASTOLIC VOL/BSA (35-75): 34.9 ML/M^2
BH CV ECHO MEAS - LV MASS(C)D: 116.3 GRAMS
BH CV ECHO MEAS - LV MASS(C)DI: 70 GRAMS/M^2
BH CV ECHO MEAS - LV SYSTOLIC VOL/BSA (12-30): 12.6 ML/M^2
BH CV ECHO MEAS - LVIDD: 4.4 CM
BH CV ECHO MEAS - LVIDS: 2.8 CM
BH CV ECHO MEAS - LVLD AP2: 6.5 CM
BH CV ECHO MEAS - LVLD AP4: 6.9 CM
BH CV ECHO MEAS - LVLS AP2: 5.2 CM
BH CV ECHO MEAS - LVLS AP4: 5.3 CM
BH CV ECHO MEAS - LVPWD: 0.82 CM
BH CV ECHO MEAS - MV A MAX VEL: 97.9 CM/SEC
BH CV ECHO MEAS - MV E MAX VEL: 100 CM/SEC
BH CV ECHO MEAS - MV E/A: 1
BH CV ECHO MEAS - PA ACC SLOPE: 323 CM/SEC^2
BH CV ECHO MEAS - PA ACC TIME: 0.2 SEC
BH CV ECHO MEAS - PA PR(ACCEL): -8.8 MMHG
BH CV ECHO MEAS - PI END-D VEL: 62.4 CM/SEC
BH CV ECHO MEAS - RAP SYSTOLE: 3 MMHG
BH CV ECHO MEAS - RVDD: 2.7 CM
BH CV ECHO MEAS - RVSP: 31.5 MMHG
BH CV ECHO MEAS - SI(CUBED): 39.9 ML/M^2
BH CV ECHO MEAS - SI(MOD-SP2): 24.7 ML/M^2
BH CV ECHO MEAS - SI(MOD-SP4): 22.3 ML/M^2
BH CV ECHO MEAS - SI(TEICH): 36.6 ML/M^2
BH CV ECHO MEAS - SV(CUBED): 66.3 ML
BH CV ECHO MEAS - SV(MOD-SP2): 41 ML
BH CV ECHO MEAS - SV(MOD-SP4): 37 ML
BH CV ECHO MEAS - SV(TEICH): 60.8 ML
BH CV ECHO MEAS - TAPSE (>1.6): 2 CM2
BH CV ECHO MEAS - TR MAX VEL: 267 CM/SEC
BH CV XLRA - RV BASE: 3.3 CM
BH CV XLRA - RV LENGTH: 6.8 CM
BH CV XLRA - RV MID: 2.5 CM
BUN BLD-MCNC: 13 MG/DL (ref 9–23)
BUN/CREAT SERPL: 18.6 (ref 7–25)
CALCIUM SPEC-SCNC: 8.7 MG/DL (ref 8.7–10.4)
CHLORIDE SERPL-SCNC: 107 MMOL/L (ref 99–109)
CO2 SERPL-SCNC: 30 MMOL/L (ref 20–31)
CREAT BLD-MCNC: 0.7 MG/DL (ref 0.6–1.3)
DEPRECATED RDW RBC AUTO: 54.1 FL (ref 37–54)
ERYTHROCYTE [DISTWIDTH] IN BLOOD BY AUTOMATED COUNT: 15.2 % (ref 11.3–14.5)
GFR SERPL CREATININE-BSD FRML MDRD: 85 ML/MIN/1.73
GLUCOSE BLD-MCNC: 119 MG/DL (ref 70–100)
HCT VFR BLD AUTO: 32.6 % (ref 34.5–44)
HGB BLD-MCNC: 10.7 G/DL (ref 11.5–15.5)
LEFT ATRIUM VOLUME INDEX: 28.3 ML/M2
LEFT ATRIUM VOLUME: 47 CM3
MCH RBC QN AUTO: 32.4 PG (ref 27–31)
MCHC RBC AUTO-ENTMCNC: 32.8 G/DL (ref 32–36)
MCV RBC AUTO: 98.8 FL (ref 80–99)
PLATELET # BLD AUTO: 184 10*3/MM3 (ref 150–450)
PMV BLD AUTO: 8.9 FL (ref 6–12)
POTASSIUM BLD-SCNC: 4 MMOL/L (ref 3.5–5.5)
RBC # BLD AUTO: 3.3 10*6/MM3 (ref 3.89–5.14)
SODIUM BLD-SCNC: 138 MMOL/L (ref 132–146)
WBC NRBC COR # BLD: 5.82 10*3/MM3 (ref 3.5–10.8)

## 2017-03-13 PROCEDURE — 80048 BASIC METABOLIC PNL TOTAL CA: CPT | Performed by: PHYSICIAN ASSISTANT

## 2017-03-13 PROCEDURE — G0378 HOSPITAL OBSERVATION PER HR: HCPCS

## 2017-03-13 PROCEDURE — 99225 PR SBSQ OBSERVATION CARE/DAY 25 MINUTES: CPT | Performed by: NURSE PRACTITIONER

## 2017-03-13 PROCEDURE — 85027 COMPLETE CBC AUTOMATED: CPT | Performed by: PHYSICIAN ASSISTANT

## 2017-03-13 PROCEDURE — 25010000002 MIDAZOLAM PER 1 MG: Performed by: INTERNAL MEDICINE

## 2017-03-13 PROCEDURE — 25010000002 FENTANYL CITRATE (PF) 100 MCG/2ML SOLUTION: Performed by: INTERNAL MEDICINE

## 2017-03-13 PROCEDURE — C1760 CLOSURE DEV, VASC: HCPCS | Performed by: INTERNAL MEDICINE

## 2017-03-13 PROCEDURE — C1894 INTRO/SHEATH, NON-LASER: HCPCS | Performed by: INTERNAL MEDICINE

## 2017-03-13 PROCEDURE — 93458 L HRT ARTERY/VENTRICLE ANGIO: CPT | Performed by: INTERNAL MEDICINE

## 2017-03-13 PROCEDURE — C1769 GUIDE WIRE: HCPCS | Performed by: INTERNAL MEDICINE

## 2017-03-13 PROCEDURE — 99024 POSTOP FOLLOW-UP VISIT: CPT | Performed by: INTERNAL MEDICINE

## 2017-03-13 PROCEDURE — 0 IOPAMIDOL PER 1 ML: Performed by: INTERNAL MEDICINE

## 2017-03-13 RX ORDER — ACETAMINOPHEN 325 MG/1
650 TABLET ORAL EVERY 4 HOURS PRN
Status: DISCONTINUED | OUTPATIENT
Start: 2017-03-13 | End: 2017-03-13 | Stop reason: HOSPADM

## 2017-03-13 RX ORDER — FENTANYL CITRATE 50 UG/ML
INJECTION, SOLUTION INTRAMUSCULAR; INTRAVENOUS AS NEEDED
Status: DISCONTINUED | OUTPATIENT
Start: 2017-03-13 | End: 2017-03-13 | Stop reason: HOSPADM

## 2017-03-13 RX ORDER — LIDOCAINE HYDROCHLORIDE 10 MG/ML
INJECTION, SOLUTION INFILTRATION; PERINEURAL AS NEEDED
Status: DISCONTINUED | OUTPATIENT
Start: 2017-03-13 | End: 2017-03-13 | Stop reason: HOSPADM

## 2017-03-13 RX ORDER — MIDAZOLAM HYDROCHLORIDE 1 MG/ML
INJECTION INTRAMUSCULAR; INTRAVENOUS AS NEEDED
Status: DISCONTINUED | OUTPATIENT
Start: 2017-03-13 | End: 2017-03-13 | Stop reason: HOSPADM

## 2017-03-13 RX ADMIN — DOCUSATE SODIUM 200 MG: 100 CAPSULE, LIQUID FILLED ORAL at 17:24

## 2017-03-13 RX ADMIN — TRAMADOL HYDROCHLORIDE 50 MG: 50 TABLET, COATED ORAL at 08:26

## 2017-03-13 RX ADMIN — CLOPIDOGREL BISULFATE 75 MG: 75 TABLET, FILM COATED ORAL at 08:23

## 2017-03-13 RX ADMIN — ASPIRIN 325 MG ORAL TABLET 325 MG: 325 PILL ORAL at 08:23

## 2017-03-13 RX ADMIN — Medication 1 PATCH: at 16:43

## 2017-03-13 RX ADMIN — DOCUSATE SODIUM 200 MG: 100 CAPSULE, LIQUID FILLED ORAL at 08:25

## 2017-03-13 RX ADMIN — LISINOPRIL 5 MG: 5 TABLET ORAL at 08:23

## 2017-03-13 NOTE — DISCHARGE INSTR - APPOINTMENTS
Call [phone]  to make an appointment with Dr. Cordon to be seen in on week after discharge.   Please call the office to confirm your appointment.   [address]  [facility] [address]  [facility] [address]        Call Dr. Hammer office at [phone] and schedule an appointment for one month.   [address]  [facility] [address]  [facility] [address]

## 2017-03-13 NOTE — PROGRESS NOTES
"  [facility]  PROGRESS NOTE    Date of Admission: 3/11/2017  Length of Stay: 1  Primary Care Physician: Barby Cordon MD    Chief Complaint: f/u Chest pain   Problem List:   1. Chest pain with abnormal (non-specific) EKG.  2. Hypertension  3. Hyperlipidemia  4. History of tobacco abuse, inactive 3 years ago  5. Rheumatoid and psoriatic arthritis  6. Depression   7. History of detached retina  8. Surgical history:  A. Partial colectomy   B. FRANCISCO JAVIER  C. Appendectomy    Subjective      No further chest pain, slept well, comfortable. Had some remaining questions about the procedure, and discussed in detail. Clinically \"stable\"       Objective     Visit Vitals   • /66   • Pulse 56   • Temp 97.8 °F (36.6 °C) (Temporal Artery )   • Resp 12   • Ht 68\" (172.7 cm)   • Wt 123 lb (55.8 kg)   • SpO2 95%   • BMI 18.7 kg/m2       Physical Exam:  GENERAL: Alert, cooperative, in no acute distress.   HEENT: Fundiscopic deferred, otherwise unremarkable.  NECK: No Jugular venous distention, adenopathy, or thyromegaly noted.   HEART: No discrete PMI is noted. Regular rhythm, normal rate, and no murmurs, gallops, or rubs. Normal exam.  LUNGS: Clear to auscultation bilaterally. No wheezing, rales or ronchi.  ABDOMEN: Flat without evidence of organomegaly, masses, or tenderness.  NEUROLOGIC: No focal abnormalities involving strength or sensation are noted.   EXTREMITIES: No clubbing, cyanosis, or edema noted. Peripheral pulses are present.    Results:    Results from last 7 days  Lab Units 03/13/17  0603 03/12/17  0622 03/11/17  1450   WBC 10*3/mm3 5.82 7.82 7.64   HEMOGLOBIN g/dL 10.7* 10.7* 11.9   HEMATOCRIT % 32.6* 32.8* 35.8   PLATELETS 10*3/mm3 184 182 186       Results from last 7 days  Lab Units 03/13/17  0603 03/12/17  0622 03/11/17  1450   SODIUM mmol/L 138 140 142   POTASSIUM mmol/L 4.0 3.4* 3.2*   CHLORIDE mmol/L 107 105 106   TOTAL CO2 mmol/L 30.0 33.0* 34.0*   BUN mg/dL 13 14 15 "   CREATININE mg/dL 0.70 0.80 0.80   GLUCOSE mg/dL 119* 107* 139*      Lab Results   Component Value Date    CHOL 166 03/12/2017    TRIG 241 (H) 03/12/2017    HDL 39 (L) 03/12/2017    LDLDIRECT 105 03/12/2017    AST 22 03/12/2017    ALT 20 03/12/2017       Results from last 7 days  Lab Units 03/12/17  0622   HEMOGLOBIN A1C % 5.30       Results from last 7 days  Lab Units 03/11/17  1450   BNP pg/mL 10.0       Results from last 7 days  Lab Units 03/11/17  2333 03/11/17  2002   TROPONIN I ng/mL <0.006 <0.006       Intake/Output Summary (Last 24 hours) at 03/13/17 0920  Last data filed at 03/12/17 2107   Gross per 24 hour   Intake      0 ml   Output    700 ml   Net   -700 ml     I personally viewed and interpreted the patient's EKG/Telemetry data    Radiology Data:   CXR 3/11/17:  FINDINGS: The heart, mediastinum and pulmonary vasculature appear within  normal limits. Lungs appear well-expanded and clear bilaterally. No  edema, effusion or pneumothorax is seen.       IMPRESSION:  Portable chest exam with no evidence of active disease.    Current Medications:    aspirin 325 mg Oral Daily   carvedilol 6.25 mg Oral Q12H   clopidogrel 75 mg Oral Daily   docusate sodium 200 mg Oral BID   lisinopril 5 mg Oral Q24H   rosuvastatin 5 mg Oral Weekly          Assessment and Plan:     1. Chest pain  - symptoms of unstable angina, troponins negative x2, EKG with ST depression in lateral leads: I strongly suspect ischemic heart disease.  - Echo pending read  - [facility] with intervention standby today, answered some more questions this morning.      2. HTN: controlled  3. HLD: on Crestor 5 mg nightly.     Final disposition to follow.     Cyndi Michelle PA-C   03/13/17   9:20 AM

## 2017-03-13 NOTE — PLAN OF CARE
"Problem: Patient Care Overview (Adult)  Goal: Plan of Care Review  Outcome: Ongoing (interventions implemented as appropriate)    03/13/17 0436   Coping/Psychosocial Response Interventions   Plan Of Care Reviewed With patient   Patient Care Overview   Progress no change   Outcome Evaluation   Outcome Summary/Follow up Plan Pt's vitals stable. Pt refused \"all Beta Blockers\" as they drop her heart rate too low, becomes symptomatic. Pt remained normal sinus to sinus jake. Pt denied chest pain. Pt did not sign consent for heart cath - still had questions, did remain NPO after midnight however. Pt inquired why Trinttellix 10mg not continuned on hospital MAR, will address with MD. Pt slept through the night.        Goal: Discharge Needs Assessment  Outcome: Ongoing (interventions implemented as appropriate)    03/12/17 1503 03/13/17 0436   Discharge Needs Assessment   Concerns To Be Addressed --  denies needs/concerns at this time   Readmission Within The Last 30 Days no previous admission in last 30 days --    Equipment Needed After Discharge none --    Discharge Disposition still a patient --    Current Health   Anticipated Changes Related to Illness none --    Self-Care   Equipment Currently Used at Home none --    Living Environment   Transportation Available car;family or friend will provide --          Problem: Acute Coronary Syndrome (ACS) (Adult)  Goal: Signs and Symptoms of Listed Potential Problems Will be Absent or Manageable (Acute Coronary Syndrome)  Outcome: Ongoing (interventions implemented as appropriate)    03/13/17 0436   Acute Coronary Syndrome (ACS)   Problems Assessed (Acute Coronary Syndrome (ACS)) all   Problems Present (Acute Coronary Syndrome (ACS)) none           "

## 2017-03-13 NOTE — PLAN OF CARE
"Problem: Patient Care Overview (Adult)  Goal: Plan of Care Review  Outcome: Ongoing (interventions implemented as appropriate)    03/13/17 6563   Coping/Psychosocial Response Interventions   Plan Of Care Reviewed With patient   Patient Care Overview   Progress progress toward functional goals as expected   Outcome Evaluation   Outcome Summary/Follow up Plan Pt had a clean ht cath today. Site is oozing put abdon patch on site. Pt refuses \"all beta blockers\". Pt has remain NS today. Pt denies cheat pain.            "

## 2017-03-13 NOTE — PROGRESS NOTES
"Hospitalist Daily Progress Note    Date of Admission: 3/11/2017   LOS: 0 days   PCP: Barby Cordon MD    Chief Complaint: chest pain    Subjective   Chest Pain    Associated symptoms include back pain. Pertinent negatives include no cough, fever or palpitations.   Patient seen this afternoon after her heart cath.  She denies any chest pain at this time.  She is having some low back pain from having to lay still.  She is hungry and would like to eat. Right groin site is intact.  She would love to go home today but is okay with staying until tomorrow morning.      Review of Systems   Constitutional: Negative for fever.   Respiratory: Negative for cough.    Cardiovascular: Negative for chest pain and palpitations.   Genitourinary: Negative.    Musculoskeletal: Positive for back pain.   Neurological: Negative.    Psychiatric/Behavioral: Negative.          Objective   Physical Exam:  I have reviewed the vital signs.  Temp:  [97.4 °F (36.3 °C)-97.8 °F (36.6 °C)] 97.6 °F (36.4 °C)  Heart Rate:  [55-71] 55  Resp:  [12-18] 14  BP: (106-142)/(66-93) 110/84    Objective:  Vital signs: (most recent): Blood pressure 110/84, pulse 55, temperature 97.6 °F (36.4 °C), temperature source Oral, resp. rate 14, height 68\" (172.7 cm), weight 123 lb (55.8 kg), SpO2 92 %.  No fever.            General: Resting in bed, NAD, no family at bedside.  HEENT: Normocephalic, mucous membranes moist, pupils equal  Neck: Supple, trachea midline  Cardiovascular: Regular rate and rhythm, S1-S2, no murmur  Respiratory: Clear to auscultation bilaterally, even unlabored breathing on room air  Abdomen: Soft, nontender, nondistended, bowel sounds +  Skin: Clean, dry, right groin access site stained but not oozing.    Extremities: BARRAZA, Symmetrical, pulses +, no edema noted  Neuro: Alert, oriented ×4, and pedal pushes equal, sensation intact  Psych: Appropriate, cooperative    Results Review:    I have reviewed the labs, radiology results and " diagnostic studies.      Results from last 7 days  Lab Units 03/13/17  0603   WBC 10*3/mm3 5.82   HEMOGLOBIN g/dL 10.7*   PLATELETS 10*3/mm3 184       Results from last 7 days  Lab Units 03/13/17  0603   SODIUM mmol/L 138   POTASSIUM mmol/L 4.0   TOTAL CO2 mmol/L 30.0   CREATININE mg/dL 0.70   GLUCOSE mg/dL 119*       I have reviewed the medications.    ---------------------------------------------------------------------------------------------  Assessment/Plan   Assessment & Plan  Assessment/Problem List    Active Problems:    Chest pain       Plan  -BNP wnl, troponin x 4 negative, EKG with ST depression in lateral leads. No old EKGs for comparison.   -CXR  shows no evidence of active disease  -A1c 5.3%  -FLP with T.Chol 166, , HDL 39, Trig. 241.  Continue crestor  -ECHO with no abnormalities except for mild aortic valve sclerosis  -Cards consulted.  S/p C today- no abnormalities  -continue bedrest per protocol tonight.  Hopefully home tomorrow if okay with Cards.      -lipase 147, CT abd/pelvis unremarkable, no pancreatitis.  -low potassium, replaced.      DVT prophylaxis: lovenox  Discharge Planning: I expect patient to be discharged home tomorrow if okay with Dr. Hammer.    Bertha Evans, APRN 03/13/17 3:15 PM

## 2017-03-14 NOTE — NURSING NOTE
Pt's R Groin site apparently was still oozing at 19:00 - cardiology PA aware. Dressing was clean, dry and intact at initial assessment @ 20:00, remained clean, dry and intact - no more oozing. Pt denied any pain, no hematoma or bruising noted. Pt reviewed discharge instructions, had no additional questions. IV and telemetry box removed. Pt in no apparent distress, awaiting transport.

## 2018-08-09 ENCOUNTER — HOSPITAL ENCOUNTER (OUTPATIENT)
Dept: ULTRASOUND IMAGING | Facility: HOSPITAL | Age: 63
Discharge: HOME | End: 2018-08-09

## 2018-08-09 ENCOUNTER — HOSPITAL ENCOUNTER (OUTPATIENT)
Dept: MAMMOGRAPHY | Facility: HOSPITAL | Age: 63
Discharge: HOME | End: 2018-08-09
Attending: FAMILY MEDICINE | Admitting: FAMILY MEDICINE

## 2018-08-09 DIAGNOSIS — N63.23 BREAST LUMP ON LEFT SIDE AT 4 O'CLOCK POSITION: ICD-10-CM

## 2018-08-09 PROCEDURE — 77066 DX MAMMO INCL CAD BI: CPT

## 2018-08-09 PROCEDURE — 76642 ULTRASOUND BREAST LIMITED: CPT | Performed by: RADIOLOGY

## 2018-08-09 PROCEDURE — 76642 ULTRASOUND BREAST LIMITED: CPT

## 2018-08-09 PROCEDURE — 77066 DX MAMMO INCL CAD BI: CPT | Performed by: RADIOLOGY

## 2018-08-09 PROCEDURE — G0279 TOMOSYNTHESIS, MAMMO: HCPCS

## 2018-08-09 PROCEDURE — 77062 BREAST TOMOSYNTHESIS BI: CPT | Performed by: RADIOLOGY

## 2021-06-17 ENCOUNTER — HOSPITAL ENCOUNTER (OUTPATIENT)
Dept: GENERAL RADIOLOGY | Facility: HOSPITAL | Age: 66
Discharge: HOME | End: 2021-06-17
Admitting: FAMILY MEDICINE

## 2021-06-17 ENCOUNTER — TRANSCRIBE ORDERS (OUTPATIENT)
Dept: GENERAL RADIOLOGY | Facility: HOSPITAL | Age: 66
End: 2021-06-17

## 2021-06-17 DIAGNOSIS — M25.551 RIGHT HIP PAIN: ICD-10-CM

## 2021-06-17 DIAGNOSIS — M25.551 RIGHT HIP PAIN: Primary | ICD-10-CM

## 2021-06-17 PROCEDURE — 73502 X-RAY EXAM HIP UNI 2-3 VIEWS: CPT

## 2022-01-26 RX ORDER — SODIUM, POTASSIUM,MAG SULFATES 17.5-3.13G
SOLUTION, RECONSTITUTED, ORAL ORAL
Qty: 354 ML | Refills: 0 | Status: SHIPPED | OUTPATIENT
Start: 2022-01-26

## 2022-02-02 ENCOUNTER — OUTSIDE FACILITY SERVICE (OUTPATIENT)
Dept: GASTROENTEROLOGY | Facility: CLINIC | Age: 67
End: 2022-02-02

## 2022-02-02 PROCEDURE — 45385 COLONOSCOPY W/LESION REMOVAL: CPT | Performed by: INTERNAL MEDICINE

## 2023-05-15 ENCOUNTER — TRANSCRIBE ORDERS (OUTPATIENT)
Dept: ADMINISTRATIVE | Facility: HOSPITAL | Age: 68
End: 2023-05-15
Payer: MEDICARE

## 2023-05-15 DIAGNOSIS — F17.200 NICOTINE DEPENDENCE, UNCOMPLICATED, UNSPECIFIED NICOTINE PRODUCT TYPE: Primary | ICD-10-CM

## 2023-08-10 ENCOUNTER — LAB (OUTPATIENT)
Dept: LAB | Facility: HOSPITAL | Age: 68
End: 2023-08-10
Payer: MEDICARE

## 2023-08-10 DIAGNOSIS — E78.00 PURE HYPERCHOLESTEROLEMIA: Primary | ICD-10-CM

## 2023-08-10 PROCEDURE — 80061 LIPID PANEL: CPT

## 2023-08-10 PROCEDURE — 36415 COLL VENOUS BLD VENIPUNCTURE: CPT

## 2023-08-10 PROCEDURE — 80053 COMPREHEN METABOLIC PANEL: CPT

## 2023-08-11 LAB
ALBUMIN SERPL-MCNC: 4.7 G/DL (ref 3.5–5.2)
ALBUMIN/GLOB SERPL: 2.2 G/DL
ALP SERPL-CCNC: 56 U/L (ref 39–117)
ALT SERPL W P-5'-P-CCNC: 11 U/L (ref 1–33)
ANION GAP SERPL CALCULATED.3IONS-SCNC: 10.6 MMOL/L (ref 5–15)
AST SERPL-CCNC: 21 U/L (ref 1–32)
BILIRUB SERPL-MCNC: 0.3 MG/DL (ref 0–1.2)
BUN SERPL-MCNC: 20 MG/DL (ref 8–23)
BUN/CREAT SERPL: 22 (ref 7–25)
CALCIUM SPEC-SCNC: 9.3 MG/DL (ref 8.6–10.5)
CHLORIDE SERPL-SCNC: 107 MMOL/L (ref 98–107)
CHOLEST SERPL-MCNC: 169 MG/DL (ref 0–200)
CO2 SERPL-SCNC: 23.4 MMOL/L (ref 22–29)
CREAT SERPL-MCNC: 0.91 MG/DL (ref 0.57–1)
EGFRCR SERPLBLD CKD-EPI 2021: 69.3 ML/MIN/1.73
GLOBULIN UR ELPH-MCNC: 2.1 GM/DL
GLUCOSE SERPL-MCNC: 109 MG/DL (ref 65–99)
HDLC SERPL-MCNC: 48 MG/DL (ref 40–60)
LDLC SERPL CALC-MCNC: 101 MG/DL (ref 0–100)
LDLC/HDLC SERPL: 2.07 {RATIO}
POTASSIUM SERPL-SCNC: 4.7 MMOL/L (ref 3.5–5.2)
PROT SERPL-MCNC: 6.8 G/DL (ref 6–8.5)
SODIUM SERPL-SCNC: 141 MMOL/L (ref 136–145)
TRIGL SERPL-MCNC: 108 MG/DL (ref 0–150)
VLDLC SERPL-MCNC: 20 MG/DL (ref 5–40)

## 2024-07-23 ENCOUNTER — HOSPITAL ENCOUNTER (OUTPATIENT)
Dept: GENERAL RADIOLOGY | Facility: HOSPITAL | Age: 69
Discharge: HOME | End: 2024-07-23
Payer: MEDICARE

## 2024-07-23 ENCOUNTER — LAB (OUTPATIENT)
Dept: LAB | Facility: HOSPITAL | Age: 69
End: 2024-07-23
Payer: MEDICARE

## 2024-07-23 ENCOUNTER — TRANSCRIBE ORDERS (OUTPATIENT)
Dept: LAB | Facility: HOSPITAL | Age: 69
End: 2024-07-23
Payer: MEDICARE

## 2024-07-23 ENCOUNTER — TRANSCRIBE ORDERS (OUTPATIENT)
Dept: ADMINISTRATIVE | Facility: HOSPITAL | Age: 69
End: 2024-07-23
Payer: MEDICARE

## 2024-07-23 DIAGNOSIS — I10 ESSENTIAL HYPERTENSION, MALIGNANT: ICD-10-CM

## 2024-07-23 DIAGNOSIS — N39.0 URINARY TRACT INFECTION WITHOUT HEMATURIA, SITE UNSPECIFIED: ICD-10-CM

## 2024-07-23 DIAGNOSIS — A09 INFECTIOUS COLITIS, ENTERITIS, AND GASTROENTERITIS: Primary | ICD-10-CM

## 2024-07-23 DIAGNOSIS — E78.00 PURE HYPERCHOLESTEROLEMIA: ICD-10-CM

## 2024-07-23 DIAGNOSIS — R05.9 COUGH, UNSPECIFIED TYPE: Primary | ICD-10-CM

## 2024-07-23 DIAGNOSIS — A09 INFECTIOUS COLITIS, ENTERITIS, AND GASTROENTERITIS: ICD-10-CM

## 2024-07-23 LAB
ALBUMIN SERPL-MCNC: 4.5 G/DL (ref 3.5–5.2)
ALBUMIN/GLOB SERPL: 1.6 G/DL
ALP SERPL-CCNC: 61 U/L (ref 39–117)
ALT SERPL W P-5'-P-CCNC: 5 U/L (ref 1–33)
ANION GAP SERPL CALCULATED.3IONS-SCNC: 13.1 MMOL/L (ref 5–15)
AST SERPL-CCNC: 14 U/L (ref 1–32)
BILIRUB SERPL-MCNC: 0.4 MG/DL (ref 0–1.2)
BUN SERPL-MCNC: 9 MG/DL (ref 8–23)
BUN/CREAT SERPL: 9.8 (ref 7–25)
CALCIUM SPEC-SCNC: 9.4 MG/DL (ref 8.6–10.5)
CHLORIDE SERPL-SCNC: 101 MMOL/L (ref 98–107)
CHOLEST SERPL-MCNC: 196 MG/DL (ref 0–200)
CO2 SERPL-SCNC: 23.9 MMOL/L (ref 22–29)
CREAT SERPL-MCNC: 0.92 MG/DL (ref 0.57–1)
EGFRCR SERPLBLD CKD-EPI 2021: 68 ML/MIN/1.73
GLOBULIN UR ELPH-MCNC: 2.8 GM/DL
GLUCOSE SERPL-MCNC: 106 MG/DL (ref 65–99)
HDLC SERPL-MCNC: 38 MG/DL (ref 40–60)
LDLC SERPL CALC-MCNC: 106 MG/DL (ref 0–100)
LDLC/HDLC SERPL: 2.56 {RATIO}
POTASSIUM SERPL-SCNC: 2.9 MMOL/L (ref 3.5–5.2)
PROT SERPL-MCNC: 7.3 G/DL (ref 6–8.5)
SODIUM SERPL-SCNC: 138 MMOL/L (ref 136–145)
TRIGL SERPL-MCNC: 304 MG/DL (ref 0–150)
TSH SERPL DL<=0.05 MIU/L-ACNC: 2.71 UIU/ML (ref 0.27–4.2)
VLDLC SERPL-MCNC: 52 MG/DL (ref 5–40)

## 2024-07-23 PROCEDURE — 85007 BL SMEAR W/DIFF WBC COUNT: CPT

## 2024-07-23 PROCEDURE — 36415 COLL VENOUS BLD VENIPUNCTURE: CPT

## 2024-07-23 PROCEDURE — 87086 URINE CULTURE/COLONY COUNT: CPT

## 2024-07-23 PROCEDURE — 80053 COMPREHEN METABOLIC PANEL: CPT | Performed by: FAMILY MEDICINE

## 2024-07-23 PROCEDURE — 85027 COMPLETE CBC AUTOMATED: CPT

## 2024-07-23 PROCEDURE — 71046 X-RAY EXAM CHEST 2 VIEWS: CPT

## 2024-07-23 PROCEDURE — 84443 ASSAY THYROID STIM HORMONE: CPT

## 2024-07-23 PROCEDURE — 80061 LIPID PANEL: CPT

## 2024-07-24 LAB
BACTERIA SPEC AEROBE CULT: NORMAL
BASOPHILS # BLD MANUAL: 0 10*3/MM3 (ref 0–0.2)
BASOPHILS NFR BLD MANUAL: 0 % (ref 0–1.5)
BURR CELLS BLD QL SMEAR: ABNORMAL
DEPRECATED RDW RBC AUTO: 42.5 FL (ref 37–54)
EOSINOPHIL # BLD MANUAL: 0 10*3/MM3 (ref 0–0.4)
EOSINOPHIL NFR BLD MANUAL: 0 % (ref 0.3–6.2)
ERYTHROCYTE [DISTWIDTH] IN BLOOD BY AUTOMATED COUNT: 12.4 % (ref 12.3–15.4)
HCT VFR BLD AUTO: 41.9 % (ref 34–46.6)
HGB BLD-MCNC: 14.2 G/DL (ref 12–15.9)
LYMPHOCYTES # BLD MANUAL: 2.73 10*3/MM3 (ref 0.7–3.1)
LYMPHOCYTES NFR BLD MANUAL: 7.2 % (ref 5–12)
MCH RBC QN AUTO: 31.8 PG (ref 26.6–33)
MCHC RBC AUTO-ENTMCNC: 33.9 G/DL (ref 31.5–35.7)
MCV RBC AUTO: 93.9 FL (ref 79–97)
MONOCYTES # BLD: 0.83 10*3/MM3 (ref 0.1–0.9)
NEUTROPHILS # BLD AUTO: 7.97 10*3/MM3 (ref 1.7–7)
NEUTROPHILS NFR BLD MANUAL: 69.1 % (ref 42.7–76)
PLAT MORPH BLD: NORMAL
PLATELET # BLD AUTO: 303 10*3/MM3 (ref 140–450)
PMV BLD AUTO: 9.8 FL (ref 6–12)
POIKILOCYTOSIS BLD QL SMEAR: ABNORMAL
RBC # BLD AUTO: 4.46 10*6/MM3 (ref 3.77–5.28)
SPHEROCYTES BLD QL SMEAR: ABNORMAL
VARIANT LYMPHS NFR BLD MANUAL: 23.7 % (ref 19.6–45.3)
WBC MORPH BLD: NORMAL
WBC NRBC COR # BLD AUTO: 11.54 10*3/MM3 (ref 3.4–10.8)

## 2024-08-22 ENCOUNTER — LAB (OUTPATIENT)
Dept: LAB | Facility: HOSPITAL | Age: 69
End: 2024-08-22
Payer: MEDICARE

## 2024-08-22 ENCOUNTER — TRANSCRIBE ORDERS (OUTPATIENT)
Dept: LAB | Facility: HOSPITAL | Age: 69
End: 2024-08-22
Payer: MEDICARE

## 2024-08-22 DIAGNOSIS — N39.0 URINARY TRACT INFECTION WITHOUT HEMATURIA, SITE UNSPECIFIED: Primary | ICD-10-CM

## 2024-08-22 DIAGNOSIS — N39.0 URINARY TRACT INFECTION WITHOUT HEMATURIA, SITE UNSPECIFIED: ICD-10-CM

## 2024-08-22 LAB
ANION GAP SERPL CALCULATED.3IONS-SCNC: 10 MMOL/L (ref 5–15)
BACTERIA UR QL AUTO: NORMAL /HPF
BILIRUB UR QL STRIP: NEGATIVE
BUN SERPL-MCNC: 19 MG/DL (ref 8–23)
BUN/CREAT SERPL: 18.8 (ref 7–25)
CALCIUM SPEC-SCNC: 9.8 MG/DL (ref 8.6–10.5)
CHLORIDE SERPL-SCNC: 105 MMOL/L (ref 98–107)
CLARITY UR: CLEAR
CO2 SERPL-SCNC: 23 MMOL/L (ref 22–29)
COLOR UR: YELLOW
CREAT SERPL-MCNC: 1.01 MG/DL (ref 0.57–1)
EGFRCR SERPLBLD CKD-EPI 2021: 60.4 ML/MIN/1.73
GLUCOSE SERPL-MCNC: 100 MG/DL (ref 65–99)
GLUCOSE UR STRIP-MCNC: NEGATIVE MG/DL
HGB UR QL STRIP.AUTO: NEGATIVE
HOLD SPECIMEN: NORMAL
HYALINE CASTS UR QL AUTO: NORMAL /LPF
KETONES UR QL STRIP: ABNORMAL
LEUKOCYTE ESTERASE UR QL STRIP.AUTO: ABNORMAL
NITRITE UR QL STRIP: NEGATIVE
PH UR STRIP.AUTO: 5.5 [PH] (ref 5–8)
POTASSIUM SERPL-SCNC: 4.4 MMOL/L (ref 3.5–5.2)
PROT UR QL STRIP: NEGATIVE
RBC # UR STRIP: NORMAL /HPF
REF LAB TEST METHOD: NORMAL
SODIUM SERPL-SCNC: 138 MMOL/L (ref 136–145)
SP GR UR STRIP: 1.02 (ref 1–1.03)
SQUAMOUS #/AREA URNS HPF: NORMAL /HPF
UROBILINOGEN UR QL STRIP: ABNORMAL
WBC # UR STRIP: NORMAL /HPF

## 2024-08-22 PROCEDURE — 81001 URINALYSIS AUTO W/SCOPE: CPT

## 2024-08-22 PROCEDURE — 36415 COLL VENOUS BLD VENIPUNCTURE: CPT

## 2024-08-22 PROCEDURE — 80048 BASIC METABOLIC PNL TOTAL CA: CPT

## (undated) DEVICE — ST INF PRI SMRTSTE 20DRP 2VLV 24ML 117

## (undated) DEVICE — CANNULA,ADULT,SOFT-TOUCH,7'TUBE,UC: Brand: PENDING

## (undated) DEVICE — ANGIO-SEAL VIP VASCULAR CLOSURE DEVICE: Brand: ANGIO-SEAL

## (undated) DEVICE — INTRO SHEATH ART/FEM ENGAGE .038 6F12CM

## (undated) DEVICE — KT MANIFOLD CATHLAB CUST

## (undated) DEVICE — PK CATH CARD 10

## (undated) DEVICE — ST EXT IV SMARTSITE 2VLV SP M LL 5ML IV1

## (undated) DEVICE — CATH DIAG EXPO M/ PK 6FR FL4/FR4 PIG 3PK

## (undated) DEVICE — SOL NACL 0.9PCT 1000ML

## (undated) DEVICE — DRSNG SURESITE123 4X4.8IN

## (undated) DEVICE — GW J TP FIX CORE .035 150